# Patient Record
Sex: MALE | Race: OTHER
[De-identification: names, ages, dates, MRNs, and addresses within clinical notes are randomized per-mention and may not be internally consistent; named-entity substitution may affect disease eponyms.]

---

## 2020-01-31 ENCOUNTER — HOSPITAL ENCOUNTER (OUTPATIENT)
Dept: HOSPITAL 62 - END | Age: 45
Discharge: HOME | End: 2020-01-31
Attending: INTERNAL MEDICINE
Payer: OTHER GOVERNMENT

## 2020-01-31 VITALS — SYSTOLIC BLOOD PRESSURE: 128 MMHG | DIASTOLIC BLOOD PRESSURE: 87 MMHG

## 2020-01-31 DIAGNOSIS — I10: ICD-10-CM

## 2020-01-31 DIAGNOSIS — K20.9: ICD-10-CM

## 2020-01-31 DIAGNOSIS — K29.50: ICD-10-CM

## 2020-01-31 DIAGNOSIS — K29.80: Primary | ICD-10-CM

## 2020-01-31 DIAGNOSIS — Z79.899: ICD-10-CM

## 2020-01-31 PROCEDURE — 88305 TISSUE EXAM BY PATHOLOGIST: CPT

## 2020-01-31 PROCEDURE — 43239 EGD BIOPSY SINGLE/MULTIPLE: CPT

## 2020-01-31 PROCEDURE — 00731 ANES UPR GI NDSC PX NOS: CPT

## 2020-01-31 NOTE — OPERATIVE REPORT
Operative Report


DATE OF SURGERY: 01/31/20


Operative Report: 





The risks benefits and alternatives of the procedure explained to the patient in

detail and informed consent is obtained.A GIF Olympus video scope was inserted 

into the patient's mouth and hypopharynx ,the esophagus is identified intubated 

and insufflated ,the scope was then advanced through the esophagus stomach and 

duodenum, retroflexion maneuver is done ,the esophagus stomach and first and 

second portions of the duodenum examined.


PREOPERATIVE DIAGNOSIS: Dyspepsia, abdominal pain.  Patient with a previous 

history of H. pylori that was treated twice in the past


POSTOPERATIVE DIAGNOSIS: Esophagitis versus Bernard's status post biopsy.  

Gastritis status post biopsy to rule out for recurrent Helicobacter pylori.  

Duodenitis


OPERATION: EGD with biopsy


SURGEON: MARIE LESTER


ANESTHESIA: LMAC


TISSUE REMOVED OR ALTERED: As noted above.


COMPLICATIONS: 





None.


ESTIMATED BLOOD LOSS: None.


INTRAOPERATIVE FINDINGS: As noted above.


PROCEDURE: 





Patient tolerated the procedure well.


No immediate postprocedure complications are noted.


Patient is discharged in good condition.


Discharge date 1/31/2020.


Discharge diet: Regular.


Discharge activity: Regular.


2 to 3-week follow-up to discuss findings.


Patient is instructed to call the office or proceed to the emergency room should

there be any further problems or questions.


Wait on the pathology.